# Patient Record
Sex: MALE
[De-identification: names, ages, dates, MRNs, and addresses within clinical notes are randomized per-mention and may not be internally consistent; named-entity substitution may affect disease eponyms.]

---

## 2020-03-10 ENCOUNTER — HOSPITAL ENCOUNTER (EMERGENCY)
Dept: HOSPITAL 25 - UCCORT | Age: 50
Discharge: HOME | End: 2020-03-10
Payer: COMMERCIAL

## 2020-03-10 VITALS — DIASTOLIC BLOOD PRESSURE: 107 MMHG | SYSTOLIC BLOOD PRESSURE: 161 MMHG

## 2020-03-10 DIAGNOSIS — H53.2: Primary | ICD-10-CM

## 2020-03-10 DIAGNOSIS — I10: ICD-10-CM

## 2020-03-10 DIAGNOSIS — F17.210: ICD-10-CM

## 2020-03-10 DIAGNOSIS — R51: ICD-10-CM

## 2020-03-10 PROCEDURE — G0463 HOSPITAL OUTPT CLINIC VISIT: HCPCS

## 2020-03-10 PROCEDURE — 99212 OFFICE O/P EST SF 10 MIN: CPT

## 2020-03-10 NOTE — UC
Headache HPI





- HPI Summary


HPI Summary: 





headache x 7 days 


located left side of head / behind his left eye 


pain is 8 out 10, sharp  , no radiation , having visual disturbance of the left 

eye 


limited peripheral vision and diplopia , no n/v, no problem with speech , no 

facial weakness,


no upper or lower ext. weakness, no chest pain 





- History Of Current Complaint


Chief Complaint: UCHeadache


Stated Complaint: HEADACHES, SINUS ISSUE


Time Seen by Provider: 03/10/20 13:27


Hx Obtained From: Patient


Onset/Duration: Gradual Onset, Lasting Days - 7, Still Present


Onset Of Symptoms: Gradual


Currently Pain Is: Moderate


Pain Intensity: 7


Pain Scale Used: 0-10 Numeric


Timing: Constant


Character: Dull, Throbbing


Location of Headache: Frontal - left, Temporal - left


Aggravating Factor(s): Nothing


Allevating Factor(s): Nothing


Associated Signs And Symptoms: Positive: Visual Changes.  Negative: Dizziness, 

Seizure, Nausea, Vomiting, Sinus Pressure, Fever, Neck Pain, Neck Stiffness, 

Decreased LOC





- Allergies/Home Medications


Allergies/Adverse Reactions: 


 Allergies











Allergy/AdvReac Type Severity Reaction Status Date / Time


 


No Known Allergies Allergy   Verified 03/10/20 13:18











Home Medications: 


 Home Medications





NK [No Home Medications Reported]  03/10/20 [History Confirmed 03/10/20]











PMH/Surg Hx/FS Hx/Imm Hx


Cardiovascular History: Hypertension





- Surgical History


Surgical History: None





- Family History


Known Family History: Positive: Hypertension





- Social History


Alcohol Use: None


Substance Use Type: None


Smoking Status (MU): Heavy Every Day Tobacco Smoker


Type: Cigarettes


Amount Used/How Often: 1 PPD


Length of Time of Smoking/Using Tobacco: 20 years





Review of Systems


All Other Systems Reviewed And Are Negative: Yes


Constitutional: Positive: Negative


Skin: Positive: Negative


Eyes: Positive: Diplopia


ENT: Positive: Negative


Respiratory: Positive: Negative


Neurological/Mental Status: Positive: Headache


Is Patient Immunocompromised?: No





Physical Exam


Triage Information Reviewed: Yes


Appearance: Well-Nourished, Pain Distress


Vital Signs: 


 Initial Vital Signs











Temp  98.2 F   03/10/20 13:18


 


Pulse  75   03/10/20 13:18


 


Resp  16   03/10/20 13:18


 


BP  161/107   03/10/20 13:18


 


Pulse Ox  100   03/10/20 13:18











Vital Signs Reviewed: Yes


Eye Exam: Normal


Eyes: Positive: Conjunctiva Clear, Other: - limited peripheral vision of the 

left eye


ENT: Positive: Normal ENT inspection, Hearing grossly normal, Pharynx normal


Neck: Positive: Supple, Nontender, No Lymphadenopathy


Respiratory: Positive: Chest non-tender





Discharge ED





- Discharge Plan


Condition: Stable


Disposition: HOME


Patient Education Materials:  Acute Headache (ED), Hypertension (ED), Diplopia (

ED)


Referrals: 


No Primary Care Phys,NOPCP [Primary Care Provider] - 


Additional Instructions: 


please go to [facility] ED for evaluation and tx of left side headaches 

, double vision / htn 





- Billing Disposition and Condition


Condition: STABLE


Disposition: Home